# Patient Record
Sex: FEMALE
[De-identification: names, ages, dates, MRNs, and addresses within clinical notes are randomized per-mention and may not be internally consistent; named-entity substitution may affect disease eponyms.]

---

## 2024-04-17 PROBLEM — Z00.00 ENCOUNTER FOR PREVENTIVE HEALTH EXAMINATION: Status: ACTIVE | Noted: 2024-04-17

## 2024-09-05 ENCOUNTER — APPOINTMENT (OUTPATIENT)
Dept: ORTHOPEDIC SURGERY | Facility: CLINIC | Age: 44
End: 2024-09-05
Payer: COMMERCIAL

## 2024-09-05 VITALS — BODY MASS INDEX: 22.66 KG/M2 | HEIGHT: 61 IN | WEIGHT: 120 LBS

## 2024-09-05 DIAGNOSIS — K90.0 CELIAC DISEASE: ICD-10-CM

## 2024-09-05 DIAGNOSIS — M23.91 UNSPECIFIED INTERNAL DERANGEMENT OF RIGHT KNEE: ICD-10-CM

## 2024-09-05 DIAGNOSIS — Z78.9 OTHER SPECIFIED HEALTH STATUS: ICD-10-CM

## 2024-09-05 PROCEDURE — 99203 OFFICE O/P NEW LOW 30 MIN: CPT

## 2024-09-05 PROCEDURE — 73564 X-RAY EXAM KNEE 4 OR MORE: CPT | Mod: RT

## 2024-09-05 RX ORDER — MELOXICAM 15 MG/1
15 TABLET ORAL
Qty: 30 | Refills: 0 | Status: ACTIVE | COMMUNITY
Start: 2024-09-05 | End: 2024-10-05

## 2024-09-05 NOTE — PHYSICAL EXAM
[Equivocal] : equivocal anterior draw [] : patient ambulates without assistive device [TWNoteComboBox7] : flexion 120 degrees

## 2024-09-05 NOTE — HISTORY OF PRESENT ILLNESS
[5] : 5 [9] : 9 [Burning] : burning [Intermittent] : intermittent [Sleep] : sleep [Standing] : standing [Full time] : Work status: full time [de-identified] : Right knee pain for about 1 week. No new injury. She c/o pain and clicking. She has a h/o 5 surgeries to this knee. 2 acl reconstructions, 2 meniscectomies and 1 ? bursectomy.  Last surgery in 2000. She was doing ok until recently.  [] : no [FreeTextEntry1] : Right knee [FreeTextEntry6] : clicking [FreeTextEntry9] : elevation, bending [de-identified] : driving, inactivity [de-identified] : many years ago [de-identified] : [employer] [de-identified] : 5 x 1995-2000

## 2024-09-05 NOTE — DISCUSSION/SUMMARY
[de-identified] : General Dx Discussion The patient was advised of the diagnosis. The natural history of the pathology was explained in full to the patient in layman's terms. All questions were answered. The risks and benefits of surgical and non-surgical treatment alternatives were explained in full to the patient.  Case Discussed. Due to history of multiple surgeries in the past will get an mri for further evaluation for LM tear /ACLinjury  In the interim recommend Mobic.  f/u after mri.    Entered by Joselyn NGUYEN acting as a scribe. Instructions: Dr. Neil- The documentation recorded by the scribe accurately reflects the service I personally performed and the decisions made by me.

## 2024-09-10 ENCOUNTER — APPOINTMENT (OUTPATIENT)
Dept: MRI IMAGING | Facility: CLINIC | Age: 44
End: 2024-09-10
Payer: COMMERCIAL

## 2024-09-10 ENCOUNTER — TRANSCRIPTION ENCOUNTER (OUTPATIENT)
Age: 44
End: 2024-09-10

## 2024-09-10 PROCEDURE — 73721 MRI JNT OF LWR EXTRE W/O DYE: CPT | Mod: RT

## 2024-09-19 ENCOUNTER — APPOINTMENT (OUTPATIENT)
Dept: ORTHOPEDIC SURGERY | Facility: CLINIC | Age: 44
End: 2024-09-19
Payer: COMMERCIAL

## 2024-09-19 VITALS — BODY MASS INDEX: 22.66 KG/M2 | WEIGHT: 120 LBS | HEIGHT: 61 IN

## 2024-09-19 DIAGNOSIS — M65.9 SYNOVITIS AND TENOSYNOVITIS, UNSPECIFIED: ICD-10-CM

## 2024-09-19 DIAGNOSIS — S83.281D OTHER TEAR OF LATERAL MENISCUS, CURRENT INJURY, RIGHT KNEE, SUBSEQUENT ENCOUNTER: ICD-10-CM

## 2024-09-19 DIAGNOSIS — S83.511D SPRAIN OF ANTERIOR CRUCIATE LIGAMENT OF RIGHT KNEE, SUBSEQUENT ENCOUNTER: ICD-10-CM

## 2024-09-19 PROCEDURE — 99214 OFFICE O/P EST MOD 30 MIN: CPT

## 2024-09-19 NOTE — HISTORY OF PRESENT ILLNESS
[5] : 5 [9] : 9 [Burning] : burning [Intermittent] : intermittent [Sleep] : sleep [Standing] : standing [Full time] : Work status: full time [de-identified] : Patient is here today for a follow up for right knee MRI results. She reports that she feels about the same since the last visit. [] : no [FreeTextEntry1] : Right knee [FreeTextEntry6] : clicking [FreeTextEntry9] : elevation, bending [de-identified] : driving, inactivity [de-identified] : many years ago [de-identified] : [employer] [de-identified] : 5 x 1995-2000

## 2024-09-19 NOTE — PHYSICAL EXAM
[Right] : right knee [NL (0)] : extension 0 degrees [5___] : hamstring 5[unfilled]/5 [Equivocal] : equivocal anterior draw [] : no pain with varus stress [TWNoteComboBox7] : flexion 120 degrees

## 2024-09-19 NOTE — DATA REVIEWED
[MRI] : MRI [Right] : of the right [Knee] : knee [Report was reviewed and noted in the chart] : The report was reviewed and noted in the chart [FreeTextEntry1] : intact acl graft with suggestion of near vertical orientation of the graft which could result in rotational instability. prior debridement of Medial meniscus.

## 2024-09-19 NOTE — DISCUSSION/SUMMARY
[de-identified] : General Dx Discussion The patient was advised of the diagnosis. The natural history of the pathology was explained in full to the patient in layman's terms. All questions were answered. The risks and benefits of surgical and non-surgical treatment alternatives were explained in full to the patient.  Case Discussed. mri reviewed  will get a custom ACL brace and PT poss of surgery / viscosupp discussed questions answered